# Patient Record
Sex: MALE | Race: NATIVE HAWAIIAN OR OTHER PACIFIC ISLANDER
[De-identification: names, ages, dates, MRNs, and addresses within clinical notes are randomized per-mention and may not be internally consistent; named-entity substitution may affect disease eponyms.]

---

## 2022-12-04 ENCOUNTER — HOSPITAL ENCOUNTER (EMERGENCY)
Dept: HOSPITAL 75 - ER | Age: 32
Discharge: HOME | End: 2022-12-04
Payer: SELF-PAY

## 2022-12-04 VITALS — WEIGHT: 268.96 LBS | BODY MASS INDEX: 47.66 KG/M2 | HEIGHT: 62.99 IN

## 2022-12-04 VITALS — DIASTOLIC BLOOD PRESSURE: 76 MMHG | SYSTOLIC BLOOD PRESSURE: 116 MMHG

## 2022-12-04 DIAGNOSIS — F17.200: ICD-10-CM

## 2022-12-04 DIAGNOSIS — R56.9: Primary | ICD-10-CM

## 2022-12-04 LAB
ALBUMIN SERPL-MCNC: 3.8 GM/DL (ref 3.2–4.5)
ALP SERPL-CCNC: 113 U/L (ref 40–136)
ALT SERPL-CCNC: 12 U/L (ref 0–55)
BARBITURATES UR QL: NEGATIVE
BASOPHILS # BLD AUTO: 0.1 10^3/UL (ref 0–0.1)
BASOPHILS NFR BLD AUTO: 1 % (ref 0–10)
BENZODIAZ UR QL SCN: NEGATIVE
BILIRUB SERPL-MCNC: 0.8 MG/DL (ref 0.1–1)
BUN/CREAT SERPL: 12
CALCIUM SERPL-MCNC: 9 MG/DL (ref 8.5–10.1)
CHLORIDE SERPL-SCNC: 100 MMOL/L (ref 98–107)
CK SERPL-CCNC: 86 U/L (ref 30–200)
CO2 SERPL-SCNC: 19 MMOL/L (ref 21–32)
COCAINE UR QL: NEGATIVE
CREAT SERPL-MCNC: 0.84 MG/DL (ref 0.6–1.3)
EOSINOPHIL # BLD AUTO: 1.7 10^3/UL (ref 0–0.3)
EOSINOPHIL NFR BLD AUTO: 17 % (ref 0–10)
EOSINOPHIL NFR BLD MANUAL: 20 %
GFR SERPLBLD BASED ON 1.73 SQ M-ARVRAT: 119 ML/MIN
GLUCOSE SERPL-MCNC: 132 MG/DL (ref 70–105)
HCT VFR BLD CALC: 39 % (ref 40–54)
HGB BLD-MCNC: 13.6 G/DL (ref 13.3–17.7)
LYMPHOCYTES # BLD AUTO: 1.8 10^3/UL (ref 1–4)
LYMPHOCYTES NFR BLD AUTO: 18 % (ref 12–44)
MANUAL DIFFERENTIAL PERFORMED BLD QL: YES
MCH RBC QN AUTO: 28 PG (ref 25–34)
MCHC RBC AUTO-ENTMCNC: 35 G/DL (ref 32–36)
MCV RBC AUTO: 81 FL (ref 80–99)
METHADONE UR QL SCN: NEGATIVE
MONOCYTES # BLD AUTO: 0.5 10^3/UL (ref 0–1)
MONOCYTES NFR BLD AUTO: 5 % (ref 0–12)
MONOCYTES NFR BLD: 5 %
NEUTROPHILS # BLD AUTO: 5.9 10^3/UL (ref 1.8–7.8)
NEUTROPHILS NFR BLD AUTO: 59 % (ref 42–75)
NEUTS BAND NFR BLD MANUAL: 65 %
OPIATES UR QL SCN: NEGATIVE
OXYCODONE UR QL: NEGATIVE
PLATELET # BLD: 382 10^3/UL (ref 130–400)
PMV BLD AUTO: 8.7 FL (ref 9–12.2)
POLYCHROMASIA BLD QL SMEAR: (no result)
POTASSIUM SERPL-SCNC: 4 MMOL/L (ref 3.6–5)
PROPOXYPH UR QL: NEGATIVE
PROT SERPL-MCNC: 7.9 GM/DL (ref 6.4–8.2)
SODIUM SERPL-SCNC: 134 MMOL/L (ref 135–145)
TRICYCLICS UR QL SCN: NEGATIVE
VARIANT LYMPHS NFR BLD MANUAL: 10 %
WBC # BLD AUTO: 9.9 10^3/UL (ref 4.3–11)

## 2022-12-04 PROCEDURE — 85007 BL SMEAR W/DIFF WBC COUNT: CPT

## 2022-12-04 PROCEDURE — 99284 EMERGENCY DEPT VISIT MOD MDM: CPT

## 2022-12-04 PROCEDURE — 80053 COMPREHEN METABOLIC PANEL: CPT

## 2022-12-04 PROCEDURE — 36415 COLL VENOUS BLD VENIPUNCTURE: CPT

## 2022-12-04 PROCEDURE — 82550 ASSAY OF CK (CPK): CPT

## 2022-12-04 PROCEDURE — 80306 DRUG TEST PRSMV INSTRMNT: CPT

## 2022-12-04 PROCEDURE — 85027 COMPLETE CBC AUTOMATED: CPT

## 2022-12-04 NOTE — ED NEUROLOGICAL PROBLEM
General


Chief Complaint:  Neurological Problems


Stated Complaint:  SEIZURE


Nursing Triage Note:  


ARRIVED VIA EMS FROM LOCAL ER AFTER HAVING A SEIZURE.  STATES HE TAKES MEDS 


EVERYDAY TO PREVENT SEIZURES BUT DOES NOT KNOW THE NAME.


Source:  patient


Exam Limitations:  no limitations





History of Present Illness


Date Seen by Provider:  Dec 4, 2022


Time Seen by Provider:  17:10


Initial Comments


Patient is a 32-year-old male who presents to the emergency department via EMS 

after having a possible seizure.  Patient recently moved to the area from 

[place] and was at a [employer] service being held in a [employer] conference room when 

the seizure occurred.  There is no one who accompanies the patient who saw the 

seizure when it happened.  Patient speaks Polish and  was used 

to help with communication.  Patient states he does not remember the event.  He 

does have a history of seizures for which he takes Keppra.  He states his last 

seizure was approximately a month ago.  It is uncertain exactly how long the 

seizure lasted.  EMS states patient was postictal but easily arousable upon 

their arrival.  There does not appear to be any bowel or bladder incontinence 

that occurred when the seizure happened.  Patient does have a history of surgery

but is unable to provide further details on what surgery was done or for what 

reason.  Patient denies any recent head trauma.





Allergies and Home Medications


Allergies


Coded Allergies:  


     No Known Drug Allergies (Unverified , 12/4/22)





Patient Home Medication List


Home Medication List Reviewed:  Yes





Review of Systems


Review of Systems


Constitutional:  no symptoms reported


Eyes:  No Symptoms Reported


Ears, Nose, Mouth, Throat:  no symptoms reported


Respiratory:  no symptoms reported


Cardiovascular:  no symptoms reported


Gastrointestinal:  no symptoms reported


Genitourinary:  no symptoms reported


Musculoskeletal:  no symptoms reported


Skin:  no symptoms reported


Psychiatric/Neurological:  See HPI, Tonic Clonic Seizures


Endocrine:  No Symptoms Reported


Hematologic/Lymphatic:  No Symptoms Reported





Past Medical-Social-Family Hx


Patient Social History


Tobacco Use?:  Yes


Smoking Status:  Current Everyday Smoker


Substance use?:  No


Alcohol Use?:  No





Immunizations Up To Date


Second COVID19 Vaccination Carlos:  UNKNOWN


COVID19 Vaccine :  UNKONWISI





Physical Exam


Vital Signs





Vital Signs - First Documented








 12/4/22





 17:10


 


Temp 37.3


 


Pulse 124


 


Resp 16


 


B/P (MAP) 120/72 (88)


 


Pulse Ox 99


 


O2 Delivery Room Air





Capillary Refill : Less Than 3 Seconds


Height, Weight, BMI


Height: '"


Weight: lbs. oz. kg; 47.00 BMI


Method:


General Appearance:  WD/WN, no apparent distress


HEENT:  PERRL/EOMI, normal ENT inspection, TMs normal, pharynx normal


Neck:  non-tender, full range of motion, supple, normal inspection


Respiratory:  chest non-tender, lungs clear, normal breath sounds, no 

respiratory distress, no accessory muscle use


Cardiovascular:  regular rate, rhythm


Gastrointestinal:  normal bowel sounds, non tender, soft, no organomegaly, no 

pulsatile mass


Extremities:  non-tender, normal inspection, no pedal edema, no calf tenderness


Neurologic/Psychiatric:  no motor/sensory deficits, alert, normal mood/affect, 

oriented x 3


Skin:  normal color, warm/dry





Progress/Results/Core Measures


Results/Orders


Lab Results





Laboratory Tests








Test


 12/4/22


17:30 12/4/22


17:41 Range/Units


 


 


White Blood Count


 9.9 


 


 4.3-11.0


10^3/uL


 


Red Blood Count


 4.79 


 


 4.30-5.52


10^6/uL


 


Hemoglobin 13.6   13.3-17.7  g/dL


 


Hematocrit 39 L  40-54  %


 


Mean Corpuscular Volume 81   80-99  fL


 


Mean Corpuscular Hemoglobin 28   25-34  pg


 


Mean Corpuscular Hemoglobin


Concent 35 


 


 32-36  g/dL





 


Red Cell Distribution Width 13.2   10.0-14.5  %


 


Platelet Count


 382 


 


 130-400


10^3/uL


 


Mean Platelet Volume 8.7 L  9.0-12.2  fL


 


Immature Granulocyte % (Auto) 0    %


 


Neutrophils (%) (Auto) 59   42-75  %


 


Lymphocytes (%) (Auto) 18   12-44  %


 


Monocytes (%) (Auto) 5   0-12  %


 


Eosinophils (%) (Auto) 17 H  0-10  %


 


Basophils (%) (Auto) 1   0-10  %


 


Neutrophils # (Auto)


 5.9 


 


 1.8-7.8


10^3/uL


 


Lymphocytes # (Auto)


 1.8 


 


 1.0-4.0


10^3/uL


 


Monocytes # (Auto)


 0.5 


 


 0.0-1.0


10^3/uL


 


Eosinophils # (Auto)


 1.7 H


 


 0.0-0.3


10^3/uL


 


Basophils # (Auto)


 0.1 


 


 0.0-0.1


10^3/uL


 


Immature Granulocyte # (Auto)


 0.0 


 


 0.0-0.1


10^3/uL


 


Neutrophils % (Manual) 65    %


 


Lymphocytes % (Manual) 10    %


 


Monocytes % (Manual) 5    %


 


Eosinophils % (Manual) 20    %


 


Polychromasia MODERATE    


 


Sodium Level 134 L  135-145  MMOL/L


 


Potassium Level 4.0   3.6-5.0  MMOL/L


 


Chloride Level 100     MMOL/L


 


Carbon Dioxide Level 19 L  21-32  MMOL/L


 


Anion Gap 15 H  5-14  MMOL/L


 


Blood Urea Nitrogen 10   7-18  MG/DL


 


Creatinine


 0.84 


 


 0.60-1.30


MG/DL


 


Estimat Glomerular Filtration


Rate 119 


 


  





 


BUN/Creatinine Ratio 12    


 


Glucose Level 132 H    MG/DL


 


Calcium Level 9.0   8.5-10.1  MG/DL


 


Corrected Calcium 9.2   8.5-10.1  MG/DL


 


Total Bilirubin 0.8   0.1-1.0  MG/DL


 


Aspartate Amino Transf


(AST/SGOT) 20 


 


 5-34  U/L





 


Alanine Aminotransferase


(ALT/SGPT) 12 


 


 0-55  U/L





 


Alkaline Phosphatase 113     U/L


 


Total Creatine Kinase 86     U/L


 


Total Protein 7.9   6.4-8.2  GM/DL


 


Albumin 3.8   3.2-4.5  GM/DL


 


Urine Opiates Screen  NEGATIVE  NEGATIVE  


 


Urine Oxycodone Screen  NEGATIVE  NEGATIVE  


 


Urine Methadone Screen  NEGATIVE  NEGATIVE  


 


Urine Propoxyphene Screen  NEGATIVE  NEGATIVE  


 


Urine Barbiturates Screen  NEGATIVE  NEGATIVE  


 


Ur Tricyclic Antidepressants


Screen 


 NEGATIVE 


 NEGATIVE  





 


Urine Phencyclidine Screen  NEGATIVE  NEGATIVE  


 


Urine Amphetamines Screen  NEGATIVE  NEGATIVE  


 


Urine Methamphetamines Screen  NEGATIVE  NEGATIVE  


 


Urine Benzodiazepines Screen  NEGATIVE  NEGATIVE  


 


Urine Cocaine Screen  NEGATIVE  NEGATIVE  


 


Urine Cannabinoids Screen  NEGATIVE  NEGATIVE  








My Orders





Orders - NIKKO HATHAWAY APRN


Cbc With Automated Diff (12/4/22 17:30)


Comprehensive Metabolic Panel (12/4/22 17:30)


Iv/Invasive Line Insertion .IV INSERT (12/4/22 17:30)


Drug Screen Stat (Urine) (12/4/22 17:30)


Creatine Kinase (12/4/22 17:30)


Ns Iv 1000 Ml (Sodium Chloride 0.9%) (12/4/22 17:30)


Manual Differential (12/4/22 17:30)





Vital Signs/I&O











 12/4/22 12/4/22





 17:10 19:03


 


Temp 37.3 


 


Pulse 124 105


 


Resp 16 


 


B/P (MAP) 120/72 (88) 116/76


 


Pulse Ox 99 100


 


O2 Delivery Room Air Room Air














Blood Pressure Mean:                    88











Progress


Progress Note :  


Progress Note


Patient is nontoxic and well-hydrated on exam.  No adventitious lung sounds or 

increased work of breathing noted.  No focal neurologic deficits appreciated.  

Patient is able to answer questions appropriately.  He is awake and alert.  

Laboratory evaluation unremarkable.  Patient returned to baseline and had no 

further evidence of any ictal activity.  Patient is already on antiepileptic 

medications.  Discussed importance of establishing a local PCP.  No indications 

for further diagnostics at this time.  Will discharge home.  Return precautions 

for urgent symptomology discussed.  English speaking acquaintances are also 

present with the patient.  They are the ones who helped the patient moved to 

this area.  Patient verbalized understanding.





Departure


Impression





   Primary Impression:  


   Seizure


Disposition:  01 HOME, SELF-CARE


Condition:  Stable





Departure-Patient Inst.


Decision time for Depature:  18:50


Referrals:  


[facility] [place]





NO,LOCAL PHYSICIAN (PCP)


Primary Care Physician


Patient Instructions:  Seizures, Adult (DC)











NIKKO HATHAWAY              Dec 4, 2022 17:57

## 2023-01-13 ENCOUNTER — HOSPITAL ENCOUNTER (EMERGENCY)
Dept: HOSPITAL 75 - ER | Age: 33
Discharge: HOME | End: 2023-01-13
Payer: COMMERCIAL

## 2023-01-13 VITALS — BODY MASS INDEX: 47.85 KG/M2 | WEIGHT: 270.07 LBS | HEIGHT: 62.99 IN

## 2023-01-13 VITALS — SYSTOLIC BLOOD PRESSURE: 157 MMHG | DIASTOLIC BLOOD PRESSURE: 86 MMHG

## 2023-01-13 DIAGNOSIS — B37.2: Primary | ICD-10-CM

## 2023-01-13 DIAGNOSIS — G40.909: ICD-10-CM

## 2023-01-13 PROCEDURE — 82947 ASSAY GLUCOSE BLOOD QUANT: CPT

## 2023-01-13 NOTE — ED INTEGUMENTARY GENERAL
General


Chief Complaint:  Skin/Wound Problems


Stated Complaint:  BOILS ON LEG AND ODOR COMING FROM BOILS


Source:  patient


Exam Limitations:  language barrier (used )





History of Present Illness


Date Seen by Provider:  Jan 13, 2023


Time Seen by Provider:  16:55


Initial Comments


32-year-old male presents with complaints of pain and drainage to groin since 

yesterday.  Denies fever/chills, denies chest pain, shortness of air, abdominal 

pain, nausea/vomiting.  Past medical history includes seizures and diabetes.  

Only on medication for seizures, unsure of name of medication, states his 

primary never put him on medications for diabetes.  Recently moved here from 

[place] and has not yet established primary care here.





Allergies and Home Medications


Patient Home Medication List


Home Medication List Reviewed:  Yes


Nystatin (Nystatin) 100,000 Unit/Gram Powder, 15 GM TP TID


   Prescribed by: Simi Serra on 1/13/23 1709





Review of Systems


Review of Systems


Constitutional:  see HPI





Physical Exam


Vital Signs





Vital Signs - First Documented








 1/13/23





 16:36


 


Temp 35.9


 


Pulse 110


 


Resp 17


 


B/P (MAP) 117/74 (88)


 


O2 Delivery Room Air





Capillary Refill :


General Appearance:  WD/WN, no apparent distress


Neck:  supple, normal inspection


Cardiovascular:  no edema


Respiratory:  no respiratory distress, no accessory muscle use


Extremities:  normal range of motion, normal inspection


Neurologic/Psychiatric:  alert, normal mood/affect, oriented x 3


Skin:  other (Erythema and maceration noted to inguinal folds, no induration or 

abscess, no crepitus)


Skin Problem Location:  other (inguinal folds)


Skin Problem Character:  erythema, other (maceration)





Progress/Results/Core Measures


Results/Orders


Lab Results





Laboratory Tests








Test


 1/13/23


17:03 Range/Units


 


 


Glucometer 155 H   MG/DL








Vital Signs/I&O











 1/13/23





 16:36


 


Temp 35.9


 


Pulse 110


 


Resp 17


 


B/P (MAP) 117/74 (88)


 


O2 Delivery Room Air











FSBG Bedside Testing


Finger Stick Blood Glucose:  155





Progress


Progress Note :  


Progress Note


Patient seen and evaluated, resting on bed, no acute distress.  Based on exam 

and symptoms, likely a candidiasis infection to the inguinal folds.  Blood sugar

155.  Patient will be discharged with nystatin powder.  Follow-up precautions 

provided.





Departure


Impression





   Primary Impression:  


   Candida rash of groin


Disposition:  01 HOME, SELF-CARE


Condition:  Stable





Departure-Patient Inst.


Decision time for Depature:  17:13


Patient Instructions:  Yeast Diaper Rash ED





Add. Discharge Instructions:  


Use powder 3 times a day until rash improves.


Return for any new or concerning symptoms, or fever.


Call [facility] [phone] to get an appointment with a new primary care

provider.





All discharge instructions reviewed with patient and/or family. Voiced 

understanding.


Scripts


Nystatin (Nystatin) 100,000 Unit/Gram Powder


15 GM TP TID, #1 EA


   Prov: SIMI SERRA         1/13/23











SIMI SERRA        Jan 13, 2023 17:11

## 2023-08-26 ENCOUNTER — HOSPITAL ENCOUNTER (OUTPATIENT)
Dept: HOSPITAL 76 - EMS | Age: 33
Discharge: ANOTHER HOSPITAL | End: 2023-08-26
Payer: MEDICAID

## 2023-08-26 ENCOUNTER — HOSPITAL ENCOUNTER (EMERGENCY)
Dept: HOSPITAL 76 - ED | Age: 33
LOS: 1 days | Discharge: HOME | End: 2023-08-27
Payer: MEDICAID

## 2023-08-26 VITALS — OXYGEN SATURATION: 98 %

## 2023-08-26 VITALS — DIASTOLIC BLOOD PRESSURE: 77 MMHG | SYSTOLIC BLOOD PRESSURE: 119 MMHG
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
## 2023-08-26 DIAGNOSIS — S00.83XA: ICD-10-CM

## 2023-08-26 DIAGNOSIS — S09.90XA: ICD-10-CM

## 2023-08-26 DIAGNOSIS — R56.9: Primary | ICD-10-CM
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
## 2023-08-26 DIAGNOSIS — S00.81XA: ICD-10-CM

## 2023-08-26 LAB
ALBUMIN DIAFP-MCNC: 4.2 G/DL (ref 3.2–5.5)
ALBUMIN/GLOB SERPL: 1.1 {RATIO} (ref 1–2.2)
ALP SERPL-CCNC: 95 IU/L (ref 42–121)
ALT SERPL W P-5'-P-CCNC: 12 IU/L (ref 10–60)
ANION GAP SERPL CALCULATED.4IONS-SCNC: 6 MMOL/L (ref 6–13)
AST SERPL W P-5'-P-CCNC: 15 IU/L (ref 10–42)
BASOPHILS NFR BLD AUTO: 0.1 10^3/UL (ref 0–0.1)
BASOPHILS NFR BLD AUTO: 0.6 %
BILIRUB BLD-MCNC: 0.8 MG/DL (ref 0.2–1)
BUN SERPL-MCNC: 13 MG/DL (ref 6–20)
CALCIUM UR-MCNC: 9.5 MG/DL (ref 8.5–10.3)
CHLORIDE SERPL-SCNC: 102 MMOL/L (ref 101–111)
CO2 SERPL-SCNC: 28 MMOL/L (ref 21–32)
CREAT SERPLBLD-SCNC: 0.8 MG/DL (ref 0.6–1.3)
EOSINOPHIL # BLD AUTO: 0.1 10^3/UL (ref 0–0.7)
EOSINOPHIL NFR BLD AUTO: 1.2 %
ERYTHROCYTE [DISTWIDTH] IN BLOOD BY AUTOMATED COUNT: 12.6 % (ref 12–15)
GFRSERPLBLD MDRD-ARVRAT: 111 ML/MIN/{1.73_M2} (ref 89–?)
GLOBULIN SER-MCNC: 3.7 G/DL (ref 2.1–4.2)
GLUCOSE SERPL-MCNC: 96 MG/DL (ref 74–104)
HCT VFR BLD AUTO: 41.4 % (ref 42–52)
HGB UR QL STRIP: 14 G/DL (ref 14–18)
LIPASE SERPL-CCNC: 22 U/L (ref 11–82)
LYMPHOCYTES # SPEC AUTO: 1.8 10^3/UL (ref 1.5–3.5)
LYMPHOCYTES NFR BLD AUTO: 23.5 %
MCH RBC QN AUTO: 28.7 PG (ref 27–31)
MCHC RBC AUTO-ENTMCNC: 33.8 G/DL (ref 32–36)
MCV RBC AUTO: 84.8 FL (ref 80–94)
MONOCYTES # BLD AUTO: 0.5 10^3/UL (ref 0–1)
MONOCYTES NFR BLD AUTO: 6.7 %
NEUTROPHILS # BLD AUTO: 5.3 10^3/UL (ref 1.5–6.6)
NEUTROPHILS # SNV AUTO: 7.8 X10^3/UL (ref 4.8–10.8)
NEUTROPHILS NFR BLD AUTO: 67.7 %
NRBC # BLD AUTO: 0 /100WBC
NRBC # BLD AUTO: 0 X10^3/UL
PDW BLD AUTO: 8.7 FL (ref 7.4–11.4)
PLATELET # BLD: 273 10^3/UL (ref 130–450)
POTASSIUM SERPL-SCNC: 4.4 MMOL/L (ref 3.5–4.5)
PROT SPEC-MCNC: 7.9 G/DL (ref 6.4–8.9)
RBC MAR: 4.88 10^6/UL (ref 4.7–6.1)
SODIUM SERPLBLD-SCNC: 136 MMOL/L (ref 135–145)

## 2023-08-26 PROCEDURE — 80053 COMPREHEN METABOLIC PANEL: CPT

## 2023-08-26 PROCEDURE — 99284 EMERGENCY DEPT VISIT MOD MDM: CPT

## 2023-08-26 PROCEDURE — 72125 CT NECK SPINE W/O DYE: CPT

## 2023-08-26 PROCEDURE — 99283 EMERGENCY DEPT VISIT LOW MDM: CPT

## 2023-08-26 PROCEDURE — 36415 COLL VENOUS BLD VENIPUNCTURE: CPT

## 2023-08-26 PROCEDURE — 85025 COMPLETE CBC W/AUTO DIFF WBC: CPT

## 2023-08-26 PROCEDURE — 70450 CT HEAD/BRAIN W/O DYE: CPT

## 2023-08-26 PROCEDURE — 83690 ASSAY OF LIPASE: CPT

## 2023-08-26 NOTE — CT REPORT
PROCEDURE:  CERVICAL SPINE WO

 

INDICATIONS:  seizure, head injury

 

TECHNIQUE:  

Noncontrast 3 mm thick sections acquired from the skull base to the T4 level.  Sagittal and coronal r
eformats were then constructed.  For radiation dose reduction, the following was used:  automated exp
osure control, adjustment of mA and/or kV according to patient size.

 

COMPARISON:  None.

 

FINDINGS:  

Image quality:  Excellent.  

 

Bones:  No fractures or dislocations.  Visualized superior ribs are intact.  

 

Soft tissues:  Prevertebral soft tissues are normal in thickness.  No paravertebral hematomas.  No ap
ical pneumothoraces.  

 

IMPRESSION:  

No evidence acute cervical fracture or dislocation.

 

 

 

Reviewed by: Fadi Gonzalez MD on 8/26/2023 11:51 PM PDT

Approved by: Fadi Gonzalez MD on 8/26/2023 11:51 PM PDT

 

 

Station ID:  [id number]

## 2023-08-26 NOTE — ED PHYSICIAN DOCUMENTATION
PD HPI SEIZURE





- Stated complaint


Stated Complaint: SZ





- Chief complaint


Chief Complaint: Neuro





- History obtained from


History obtained from: Patient, Family, EMS





- History of Present Illness


Timing - onset: How many minutes ago (approximately 45 minutes PTA)


Number of seizures: Single, Lasted - seconds





- Additional information


Additional information: 





BIBA.


Patient speaks Malawian and no  available on [employer]  

service. Initial HPI provided by EMS (who obtained information from patient on 

scene with family translating); patient's aunt arrived to ED as I was completing

my initial physical exam and she was able to translate with facility. 


Patient had a seizure tonight, witnessed and lasted less than one minute. 

Unclear if patient fell. He denies any pain including headache, neck pain. He 

does not recall event. He has seizure history and takes keppra 750mg BID. He 

denies missed doses of medication, denies alcohol use. PMHx includes brain tumor

with surgical resection of the tumor last year. He has been receiving his 

medical care at [facility]. 





Review of Systems


Constitutional: denies: Fever


Respiratory: denies: Dyspnea, Cough


GI: denies: Abdominal Pain


Musculoskeletal: reports: Reviewed and negative


Neurologic: reports: Seizure.  denies: Generalized weakness, Headache





PD PAST MEDICAL HISTORY





- Past Medical History


Past Medical History: Yes





- Present Medications


Home Medications: 


                                Ambulatory Orders











 Medication  Instructions  Recorded  Confirmed


 


Levetiracetam [Keppra] 750 mg PO BID 08/26/23 08/26/23


 


levETIRAcetam [Keppra] 500 mg PO BID #100 tablet 08/27/23 














- Allergies


Allergies/Adverse Reactions: 


                                    Allergies











Allergy/AdvReac Type Severity Reaction Status Date / Time


 


No Known Drug Allergies Allergy   Verified 08/26/23 20:06














PD ED PE NORMAL





- Vitals


Vital signs reviewed: Yes





- General


General: Alert and oriented X 3, No acute distress, Well developed/nourished





- HEENT


HEENT: PERRL, EOMI





- Neck


Neck: No bony TTP (cervical collar in place; c-spine palpated through posterior 

opening in collar)





- Cardiac


Cardiac: RRR, No murmur





- Respiratory


Respiratory: No respiratory distress, Clear bilaterally





- Abdomen


Abdomen: Soft, Non tender





- Neuro


Neuro: Alert and oriented X 3, CNs 2-12 intact, No motor deficit, No sensory 

deficit


Eye Opening: Spontaneous


Motor: Obeys Commands


Verbal: Oriented


GCS Score: 15





PD ED PE EXPANDED





- HEENT


HEENT Visual: 


                            __________________________














                            __________________________





 1 - bruising, abrasion








Results





- Vitals


Vitals: 


                                     Oxygen











O2 Source                      Room air

















- Labs


Labs: 


                                Laboratory Tests











  08/26/23 08/26/23





  21:16 21:16


 


WBC  7.8 


 


RBC  4.88 


 


Hgb  14.0 


 


Hct  41.4 L 


 


MCV  84.8 


 


MCH  28.7 


 


MCHC  33.8 


 


RDW  12.6 


 


Plt Count  273 


 


MPV  8.7 


 


Neut # (Auto)  5.3 


 


Lymph # (Auto)  1.8 


 


Mono # (Auto)  0.5 


 


Eos # (Auto)  0.1 


 


Baso # (Auto)  0.1 


 


Absolute Nucleated RBC  0.00 


 


Nucleated RBC %  0.0 


 


Sodium   136


 


Potassium   4.4


 


Chloride   102


 


Carbon Dioxide   28


 


Anion Gap   6.0


 


BUN   13


 


Creatinine   0.8


 


Estimated GFR (MDRD)   111


 


Glucose   96


 


Calcium   9.5


 


Total Bilirubin   0.8


 


AST   15


 


ALT   12


 


Alkaline Phosphatase   95


 


Total Protein   7.9


 


Albumin   4.2


 


Globulin   3.7


 


Albumin/Globulin Ratio   1.1


 


Lipase   22














- Rads (name of study)


  ** CTH


Relevant Findings:: Prelim report reviewed, See rad report





  ** CT cervical spine


Relevant Findings:: Prelim report reviewed, See rad report





PD Medical Decision Making





- ED course


Complexity details: reviewed old records (records from [facility] faxed 

on our request, reviewed by me), reviewed results, re-evaluated patient, 

considered differential, d/w patient, d/w family


ED course: 





Normal CBC and ER abdominal panel (hct flagged as low, 41.4 which is 

insignificant and noncontributory). Patient remains seizure-free during 5-hour 

ED stay. CT head is notable for remote right frontal craniotomy, periventricular

 right frontal encephalomalacia with grouped calcifications in the right frontal

 deep white matter, no acute stroke or hemorrhage identified.  This is per the 

radiologist's interpretation.  Although there are no previous studies for 

comparison available to me nor the radiologist, I was able to get records faxed 

from [facility].  These records include an ED visit 4/19/2023 when he was 

evaluated for seizure, and CT of the head at that time has similar findings in 

the same location. Also in the faxed documents from [facility] are Notes dictated by 

oncology from outpatient visit 8/24/2023; these indicate "recurrent 

astrocytoma".  Additionally, they note patient's most recent seizure at that 

time was 8/13/2023.  Additionally, these notes indicate patient had MR of the 

brain with and without contrast on 6/6/2023, findings consistent with recurrent 

glioblastoma. Although these notes indicate patient was on keppra 1,000mg BID, 

patient's rx bottle is in ED with patient tonight and the label indicates keppra

 750mg tablets, two tablets BID. I also see outpatient prescription fills for 

the 1,500mg BID dose as is indicated on the rx bottle. The patient does not know

 if he has a neurologist and the rx bottle and outpatient fill history indicate 

the keppra is being prescribed by family practitioners in [place]. 


I discussed this case with on-call neurology for [facility] (Dr. Hall). He 

recommends increasing keppra to 2,000 mg BID. I discussed this recommendation 

with patient (again using family for translation). He is given 500mg PO keppra 

in ED and I electronically submitted rx for 500mg BID keppra to [facility] pharmacy

 in [place]. I explained to patient that he is to take his current dose and

 schedule of keppra IN ADDITION to the new rx (which will total 2 grams BID). 

Return precautions discussed. He is instructed to contact the group that is 

managing his seizures as soon as the office is open to arrange for next 

available appointment for reevaluation





Departure





- Departure


Disposition: 01 Home, Self Care


Clinical Impression: 


 Seizure





Condition: Good


Instructions:  ED Seizure Recurrent


Prescriptions: 


levETIRAcetam [Keppra] 500 mg PO BID #100 tablet


Comments: 


I spoke with the neurologist on call for [facility] (Dr. Hall); 

he recommends increasing your dose of leviteracetam (keppra) to 2,000mg twice 

per day. I have electronically submitted a prescription for this medication to 

[facility] pharmacy in [place]. 


YOU ARE TO TAKE THIS NEW PRESCRIPTION IN ADDITION TO YOUR CURRENT DOSE OF LEVIT

ERACITAM. 


Contact your doctor on Monday when the office opens.  You should contact whoever

 is prescribing medication for your seizures, tell them that you had another 

seizure over the weekend, that your dose of seizure medication was increased.  

Arrange for the next available appointment for reevaluation.


Forms:  PCP List


Discharge Date/Time: 08/27/23 01:20

## 2023-08-26 NOTE — CT REPORT
PROCEDURE:  HEAD WO

 

INDICATIONS:  seizure, history of brain tumor

 

TECHNIQUE:  

Noncontrast 4.5 mm thick angled axial sections acquired from the foramen magnum to the vertex.  For r
adiation dose reduction, the following was used:  automated exposure control, adjustment of mA and/or
 kV according to patient size.

 

COMPARISON:  None.

 

FINDINGS:  

Image quality:  Excellent.  

 

CSF spaces:  Basal cisterns are patent.  No extra-axial fluid collections.  Ventricles are normal in 
size and shape.  

 

Brain:  No midline shift.  No intracranial masses or hemorrhage.  Periventricular right frontal encep
halomalacia with grouped calcifications in the right frontal deep white matter. Small vessel ischemic
 change. Gray-white matter interface is normal.  

 

Skull and face:  Remote right frontal craniotomy. Calvarium and visualized facial bones are intact, w
ithout suspicious lesions.  

 

Sinuses:  Visualized sinuses and mastoids are clear.  

 

 

IMPRESSION:  

 

1. Remote right frontal craniotomy.

 

2. Periventricular right frontal encephalomalacia with grouped calcifications in the right frontal de
ep white matter.

 

3. No acute stroke or hemorrhage identified.

 

Comment: Comparison with old films may be helpful. Consider nonemergent brain MRI with and without co
ntrast.

 

Reviewed by: Fadi Gonzalez MD on 8/26/2023 11:54 PM PDT

Approved by: Fadi Gonzalez MD on 8/26/2023 11:54 PM PDT

 

 

Station ID:  [id number]

## 2023-09-17 ENCOUNTER — HOSPITAL ENCOUNTER (OUTPATIENT)
Dept: HOSPITAL 76 - EMS | Age: 33
Discharge: ANOTHER HOSPITAL | End: 2023-09-17
Payer: MEDICAID

## 2023-09-17 DIAGNOSIS — R56.9: Primary | ICD-10-CM
